# Patient Record
Sex: MALE | Race: WHITE
[De-identification: names, ages, dates, MRNs, and addresses within clinical notes are randomized per-mention and may not be internally consistent; named-entity substitution may affect disease eponyms.]

---

## 2020-05-03 ENCOUNTER — HOSPITAL ENCOUNTER (EMERGENCY)
Dept: HOSPITAL 47 - EC | Age: 35
Discharge: HOME | End: 2020-05-03
Payer: COMMERCIAL

## 2020-05-03 VITALS — DIASTOLIC BLOOD PRESSURE: 52 MMHG | HEART RATE: 62 BPM | TEMPERATURE: 97.7 F | SYSTOLIC BLOOD PRESSURE: 100 MMHG

## 2020-05-03 VITALS — RESPIRATION RATE: 18 BRPM

## 2020-05-03 DIAGNOSIS — Z88.1: ICD-10-CM

## 2020-05-03 DIAGNOSIS — N20.2: Primary | ICD-10-CM

## 2020-05-03 DIAGNOSIS — F17.200: ICD-10-CM

## 2020-05-03 LAB
ALBUMIN SERPL-MCNC: 3.9 G/DL (ref 3.5–5)
ALP SERPL-CCNC: 69 U/L (ref 38–126)
ALT SERPL-CCNC: 19 U/L (ref 4–49)
AMYLASE SERPL-CCNC: 69 U/L (ref 30–110)
ANION GAP SERPL CALC-SCNC: 5 MMOL/L
AST SERPL-CCNC: 26 U/L (ref 17–59)
BASOPHILS # BLD AUTO: 0 K/UL (ref 0–0.2)
BASOPHILS NFR BLD AUTO: 0 %
BUN SERPL-SCNC: 17 MG/DL (ref 9–20)
CALCIUM SPEC-MCNC: 9.1 MG/DL (ref 8.4–10.2)
CHLORIDE SERPL-SCNC: 108 MMOL/L (ref 98–107)
CO2 SERPL-SCNC: 26 MMOL/L (ref 22–30)
EOSINOPHIL # BLD AUTO: 0.2 K/UL (ref 0–0.7)
EOSINOPHIL NFR BLD AUTO: 3 %
ERYTHROCYTE [DISTWIDTH] IN BLOOD BY AUTOMATED COUNT: 4.81 M/UL (ref 4.3–5.9)
ERYTHROCYTE [DISTWIDTH] IN BLOOD: 11.8 % (ref 11.5–15.5)
GLUCOSE SERPL-MCNC: 91 MG/DL (ref 74–99)
HCT VFR BLD AUTO: 46.9 % (ref 39–53)
HGB BLD-MCNC: 15.9 GM/DL (ref 13–17.5)
LYMPHOCYTES # SPEC AUTO: 1.2 K/UL (ref 1–4.8)
LYMPHOCYTES NFR SPEC AUTO: 22 %
MCH RBC QN AUTO: 33.1 PG (ref 25–35)
MCHC RBC AUTO-ENTMCNC: 33.9 G/DL (ref 31–37)
MCV RBC AUTO: 97.5 FL (ref 80–100)
MONOCYTES # BLD AUTO: 0.3 K/UL (ref 0–1)
MONOCYTES NFR BLD AUTO: 5 %
NEUTROPHILS # BLD AUTO: 3.7 K/UL (ref 1.3–7.7)
NEUTROPHILS NFR BLD AUTO: 68 %
PH UR: 7 [PH] (ref 5–8)
PLATELET # BLD AUTO: 185 K/UL (ref 150–450)
POTASSIUM SERPL-SCNC: 3.8 MMOL/L (ref 3.5–5.1)
PROT SERPL-MCNC: 6.5 G/DL (ref 6.3–8.2)
RBC UR QL: >182 /HPF (ref 0–5)
SODIUM SERPL-SCNC: 139 MMOL/L (ref 137–145)
SP GR UR: 1.01 (ref 1–1.03)
SQUAMOUS UR QL AUTO: <1 /HPF (ref 0–4)
UROBILINOGEN UR QL STRIP: <2 MG/DL (ref ?–2)
WBC # BLD AUTO: 5.5 K/UL (ref 3.8–10.6)
WBC # UR AUTO: 13 /HPF (ref 0–5)

## 2020-05-03 PROCEDURE — 96375 TX/PRO/DX INJ NEW DRUG ADDON: CPT

## 2020-05-03 PROCEDURE — 80053 COMPREHEN METABOLIC PANEL: CPT

## 2020-05-03 PROCEDURE — 74176 CT ABD & PELVIS W/O CONTRAST: CPT

## 2020-05-03 PROCEDURE — 74018 RADEX ABDOMEN 1 VIEW: CPT

## 2020-05-03 PROCEDURE — 83690 ASSAY OF LIPASE: CPT

## 2020-05-03 PROCEDURE — 81001 URINALYSIS AUTO W/SCOPE: CPT

## 2020-05-03 PROCEDURE — 96361 HYDRATE IV INFUSION ADD-ON: CPT

## 2020-05-03 PROCEDURE — 85025 COMPLETE CBC W/AUTO DIFF WBC: CPT

## 2020-05-03 PROCEDURE — 96374 THER/PROPH/DIAG INJ IV PUSH: CPT

## 2020-05-03 PROCEDURE — 99285 EMERGENCY DEPT VISIT HI MDM: CPT

## 2020-05-03 PROCEDURE — 87086 URINE CULTURE/COLONY COUNT: CPT

## 2020-05-03 PROCEDURE — 82150 ASSAY OF AMYLASE: CPT

## 2020-05-03 PROCEDURE — 36415 COLL VENOUS BLD VENIPUNCTURE: CPT

## 2020-05-03 NOTE — CT
EXAMINATION TYPE: CT abdomen pelvis wo con

 

DATE OF EXAM: 5/3/2020

 

COMPARISON: Previous study dated 12/7/2014. 

 

HISTORY: Hematuria

 

CT DLP: 358.5 mGycm

Automated exposure control for dose reduction was used.

 

FINDINGS: Visualized portions of the lungs are clear. There is no pleural or pericardial fluid. The h
eart is not enlarged.

 

Within the abdomen, the liver, spleen and gallbladder are normal.

 

Both adrenal glands are normal.

 

There is a 4 mm calculus in the posterior pole calyx of the right kidney which is nonobstructing. The
re is a 2 mm calculus in the posterior upper pole calyx of the left kidney. There is a 3 mm calculus 
in the distal left ureter at the level of the UVJ. There is no evidence of hydronephrosis.

 

Very Limited views of the pancreas are unremarkable.

 

There is no significant retroperitoneal, iliac or inguinal adenopathy.

 

The bladder is unremarkable

 

Both the large and small bowel are normal. The appendix is unremarkable. There is no free fluid and n
o free air.

 

No osseous lesion is seen.

 

IMPRESSION: 

1. BILATERAL NONOBSTRUCTING NEPHROLITHIASIS.

2. DISTAL LEFT URETERIC CALCULUS MEASURING 3 MM IS NOT CAUSING SIGNIFICANT HYDRONEPHROSIS.

## 2020-05-03 NOTE — XR
EXAMINATION TYPE: XR KUB , 2 VIEWS

 

DATE OF EXAM ORDERED: 5/3/2020

 

HISTORY: abdominal pain.

 

COMPARISON: Previous study dated 12/7/2014.

 

FINDINGS:  The lung bases are clear.

 

Within the abdomen, the abdominal gas pattern is normal. There is no evidence of obstruction or free 
air. No unusual calcifications are seen.

 

IMPRESSION: 

 

NO ACUTE INTRA-ABDOMINAL ABNORMALITY.

## 2020-05-03 NOTE — ED
Abdominal Pain HPI





- General


Chief Complaint: Abdominal Pain


Stated Complaint: abd pain


Time Seen by Provider: 05/03/20 10:23


Source: patient, RN notes reviewed, old records reviewed


Mode of arrival: ambulatory


Limitations: no limitations





- History of Present Illness


Initial Comments: 





This Patient is a 35-year-old male presents emergency department today with 

sudden onset of left lower quadrant abdominal pain after having a bowel movement

today.  He reports he has some radiation towards his back.  He states it feels 

similar to some history of kidney stones.  Patient states that he's had no 

changes in urination.  He denies any other complaints.





- Related Data


                                  Previous Rx's











 Medication  Instructions  Recorded


 


Hydrocodone/Acetaminophen [Norco 1 each PO Q6HR PRN #20 tab 12/07/14





5-325]  


 


Ibuprofen [Motrin] 800 mg PO Q6HR PRN #30 tab 12/07/14


 


Ondansetron Odt [Zofran ODT] 4 mg PO Q8HR PRN #15 tab 12/07/14


 


Tamsulosin [Flomax] 0.4 mg PO DAILY #3 cap 12/07/14


 


HYDROcodone/APAP 5-325MG [Norco 1 tab PO Q6HR PRN 3 Days #12 tab 05/03/20





5-325]  


 


Ketorolac [Toradol] 10 mg PO Q6HR #12 tab 05/03/20


 


Tamsulosin [Flomax] 0.4 mg PO DAILY #7 cap 05/03/20











                                    Allergies











Allergy/AdvReac Type Severity Reaction Status Date / Time


 


ceftriaxone sodium Allergy  Anaphylaxis Verified 05/03/20 10:18





[From Rocephin]     














Review of Systems


ROS Statement: 


Those systems with pertinent positive or pertinent negative responses have been 

documented in the HPI.





ROS Other: All systems not noted in ROS Statement are negative.





Past Medical History


Past Medical History: No Reported History


History of Any Multi-Drug Resistant Organisms: None Reported


Past Surgical History: Tonsillectomy


Past Psychological History: No Psychological Hx Reported


Smoking Status: Current every day smoker


Past Alcohol Use History: None Reported


Past Drug Use History: None Reported





General Exam





- General Exam Comments


Initial Comments: 





35 year old male no distress. 


Limitations: no limitations


General appearance: alert, in no apparent distress


Head exam: Present: atraumatic, normocephalic, normal inspection


Eye exam: Present: normal appearance, PERRL, EOMI.  Absent: scleral icterus, 

conjunctival injection, periorbital swelling


ENT exam: Present: normal exam


Neck exam: Present: normal inspection.  Absent: tenderness, meningismus, 

lymphadenopathy


Respiratory exam: Present: normal lung sounds bilaterally.  Absent: respiratory 

distress, wheezes, rales, rhonchi, stridor


Cardiovascular Exam: Present: regular rate, normal rhythm, normal heart sounds. 

Absent: systolic murmur, diastolic murmur, rubs, gallop, clicks


GI/Abdominal exam: Present: soft, tenderness (LLQ pain)





Course


                                   Vital Signs











  05/03/20 05/03/20 05/03/20





  10:15 11:30 12:26


 


Temperature 97.9 F  


 


Pulse Rate 80 82 59 L


 


Respiratory 18 20 18





Rate   


 


Blood Pressure 124/85 124/61 99/56


 


O2 Sat by Pulse 99 99 100





Oximetry   














  05/03/20





  13:51


 


Temperature 97.7 F


 


Pulse Rate 62


 


Respiratory 18





Rate 


 


Blood Pressure 100/52


 


O2 Sat by Pulse 100





Oximetry 














Medical Decision Making





- Medical Decision Making


35 year old male with acute LLQ abdominal pain. Labs are unremarble, UA is 

positive for hematuria. Patient CT abdomen and pelvis shows 3mm L UVJ stone. Di

scussed this should pass and will dc with pain medication and flomax. Discussed 

return parameters.








- Lab Data


Result diagrams: 


                                 05/03/20 10:50





                                 05/03/20 10:50


                                   Lab Results











  05/03/20 05/03/20 05/03/20 Range/Units





  10:50 10:50 12:20 


 


WBC  5.5    (3.8-10.6)  k/uL


 


RBC  4.81    (4.30-5.90)  m/uL


 


Hgb  15.9    (13.0-17.5)  gm/dL


 


Hct  46.9    (39.0-53.0)  %


 


MCV  97.5    (80.0-100.0)  fL


 


MCH  33.1    (25.0-35.0)  pg


 


MCHC  33.9    (31.0-37.0)  g/dL


 


RDW  11.8    (11.5-15.5)  %


 


Plt Count  185    (150-450)  k/uL


 


Neutrophils %  68    %


 


Lymphocytes %  22    %


 


Monocytes %  5    %


 


Eosinophils %  3    %


 


Basophils %  0    %


 


Neutrophils #  3.7    (1.3-7.7)  k/uL


 


Lymphocytes #  1.2    (1.0-4.8)  k/uL


 


Monocytes #  0.3    (0-1.0)  k/uL


 


Eosinophils #  0.2    (0-0.7)  k/uL


 


Basophils #  0.0    (0-0.2)  k/uL


 


Sodium   139   (137-145)  mmol/L


 


Potassium   3.8   (3.5-5.1)  mmol/L


 


Chloride   108 H   ()  mmol/L


 


Carbon Dioxide   26   (22-30)  mmol/L


 


Anion Gap   5   mmol/L


 


BUN   17   (9-20)  mg/dL


 


Creatinine   0.85   (0.66-1.25)  mg/dL


 


Est GFR (CKD-EPI)AfAm   >90   (>60 ml/min/1.73 sqM)  


 


Est GFR (CKD-EPI)NonAf   >90   (>60 ml/min/1.73 sqM)  


 


Glucose   91   (74-99)  mg/dL


 


Calcium   9.1   (8.4-10.2)  mg/dL


 


Total Bilirubin   0.8   (0.2-1.3)  mg/dL


 


AST   26   (17-59)  U/L


 


ALT   19   (4-49)  U/L


 


Alkaline Phosphatase   69   ()  U/L


 


Total Protein   6.5   (6.3-8.2)  g/dL


 


Albumin   3.9   (3.5-5.0)  g/dL


 


Amylase   69   ()  U/L


 


Lipase   228   ()  U/L


 


Urine Color    Light Red  


 


Urine Appearance    Clear  (Clear)  


 


Urine pH    7.0  (5.0-8.0)  


 


Ur Specific Gravity    1.013  (1.001-1.035)  


 


Urine Protein    Trace H  (Negative)  


 


Urine Glucose (UA)    Negative  (Negative)  


 


Urine Ketones    Negative  (Negative)  


 


Urine Blood    Large H  (Negative)  


 


Urine Nitrite    Negative  (Negative)  


 


Urine Bilirubin    Negative  (Negative)  


 


Urine Urobilinogen    <2.0  (<2.0)  mg/dL


 


Ur Leukocyte Esterase    Trace H  (Negative)  


 


Urine RBC    >182 H  (0-5)  /hpf


 


Urine WBC    13 H  (0-5)  /hpf


 


Ur Squamous Epith Cells    <1  (0-4)  /hpf


 


Urine Mucus    Few H  (None)  /hpf














- Radiology Data


Radiology results: report reviewed


CTshows evidence of 3mm UVJ stone.  





Disposition


Clinical Impression: 


 Left ureteral stone





Disposition: HOME SELF-CARE


Condition: Good


Instructions (If sedation given, give patient instructions):  Ureteral Stones 

(ED)


Additional Instructions: 


Recommend follow-up with primary care doctor.  Encourage fluid intake.  Take the

medications as prescribed.  Return to ED if any alarming signs or symptoms 

occur.


Prescriptions: 


Tamsulosin [Flomax] 0.4 mg PO DAILY #7 cap


HYDROcodone/APAP 5-325MG [Norco 5-325] 1 tab PO Q6HR PRN 3 Days #12 tab


 PRN Reason: Pain


Ketorolac [Toradol] 10 mg PO Q6HR #12 tab


Is patient prescribed a controlled substance at d/c from ED?: Yes


If prescribed controlled substance>3 days was MAPS reviewed?: Prescribed <3 Days


If opioid is for acute pain is fill amount 7 days or less?: Yes


If Rx opioid, was Start Talking consent form obtained?: Yes


Referrals: 


Yousif Myers DO [Primary Care Provider] - 1-2 days


Jonnathan Cartagena MD [STAFF PHYSICIAN] - 1-2 days


Time of Disposition: 13:32

## 2020-09-19 ENCOUNTER — HOSPITAL ENCOUNTER (EMERGENCY)
Dept: HOSPITAL 47 - EC | Age: 35
Discharge: HOME | End: 2020-09-19
Payer: COMMERCIAL

## 2020-09-19 VITALS — RESPIRATION RATE: 18 BRPM

## 2020-09-19 VITALS — TEMPERATURE: 98 F | SYSTOLIC BLOOD PRESSURE: 142 MMHG | HEART RATE: 82 BPM | DIASTOLIC BLOOD PRESSURE: 80 MMHG

## 2020-09-19 DIAGNOSIS — Z88.1: ICD-10-CM

## 2020-09-19 DIAGNOSIS — F17.200: ICD-10-CM

## 2020-09-19 DIAGNOSIS — H61.892: Primary | ICD-10-CM

## 2020-09-19 PROCEDURE — 99282 EMERGENCY DEPT VISIT SF MDM: CPT

## 2020-09-19 NOTE — ED
General Adult HPI





- General


Chief complaint: ENT


Stated complaint: Lump behind left ear, Migraine


Time Seen by Provider: 09/19/20 15:28


Source: patient, RN notes reviewed


Mode of arrival: ambulatory


Limitations: no limitations





- History of Present Illness


Initial comments: 





35-year-old male presents to the emergency department for a chief complaint of 

cyst on the left ear.  Patient states he has had this cyst for years but in the 

past 3 days it has gotten bigger and caused pain.  Patient reports he cannot 

sleep on that side because it presses against the bed.  States when he does so 

it causes a headache.  Patient denies fevers.  Denies drainage.  States he has 

an appointment with dermatology in 10 days.Patient has no other complaints at 

this time including shortness of breath, chest pain, abdominal pain, nausea or 

vomiting, headache, or visual changes.





- Related Data


                                  Previous Rx's











 Medication  Instructions  Recorded


 


Hydrocodone/Acetaminophen [Norco 1 each PO Q6HR PRN #20 tab 12/07/14





5-325]  


 


Ibuprofen [Motrin] 800 mg PO Q6HR PRN #30 tab 12/07/14


 


Ondansetron Odt [Zofran ODT] 4 mg PO Q8HR PRN #15 tab 12/07/14


 


Tamsulosin [Flomax] 0.4 mg PO DAILY #3 cap 12/07/14


 


HYDROcodone/APAP 5-325MG [Norco 1 tab PO Q6HR PRN 3 Days #12 tab 05/03/20





5-325]  


 


Ketorolac [Toradol] 10 mg PO Q6HR #12 tab 05/03/20


 


Tamsulosin [Flomax] 0.4 mg PO DAILY #7 cap 05/03/20











                                    Allergies











Allergy/AdvReac Type Severity Reaction Status Date / Time


 


ceftriaxone sodium Allergy  Anaphylaxis Verified 09/19/20 15:27





[From Rocephin]     














Review of Systems


ROS Statement: 


Those systems with pertinent positive or pertinent negative responses have been 

documented in the HPI.





ROS Other: All systems not noted in ROS Statement are negative.





Past Medical History


Past Medical History: No Reported History


History of Any Multi-Drug Resistant Organisms: None Reported


Past Surgical History: Tonsillectomy


Past Psychological History: No Psychological Hx Reported


Smoking Status: Current every day smoker


Past Alcohol Use History: None Reported


Past Drug Use History: None Reported





General Exam


Limitations: no limitations


General appearance: alert, in no apparent distress


Head exam: Present: atraumatic, normocephalic, normal inspection


Eye exam: Present: normal appearance, PERRL, EOMI.  Absent: scleral icterus, 

conjunctival injection, periorbital swelling


ENT exam: Present: normal oropharynx.  Absent: normal external ear exam (Patient

has a 1 cm x 1 cm vascular cyst noted on the posterior auricle of the left ear. 

This is non-erythematous.  It is not warm to touch.  It is not consistent with 

abscess.)


Neck exam: Present: normal inspection, full ROM.  Absent: tenderness (No 

tenderness in the neck, mastoid or behind the left ear.), meningismus, 

lymphadenopathy


Respiratory exam: Present: normal lung sounds bilaterally.  Absent: respiratory 

distress, wheezes, rales, rhonchi, stridor


Cardiovascular Exam: Present: regular rate, normal rhythm, normal heart sounds. 

Absent: systolic murmur, diastolic murmur, rubs, gallop, clicks





Course


                                   Vital Signs











  09/19/20





  15:24


 


Temperature 98.4 F


 


Pulse Rate 87


 


Respiratory 18





Rate 


 


Blood Pressure 125/77


 


O2 Sat by Pulse 98





Oximetry 














Medical Decision Making





- Medical Decision Making





Patient presents with enlarged cyst on the posterior auricle of the left ear.  

This is not consistent with abscess.  It is not erythematous, warm to touch, or 

purulent.  At this time I recommend patient follow up with ENT for excision.  He

will return here for any worsening symptoms or fevers.





Disposition


Clinical Impression: 


 Cyst on ear





Disposition: HOME SELF-CARE


Condition: Good


Instructions (If sedation given, give patient instructions):  Cyst (ED)


Additional Instructions: 


Please follow-up with ENT.  If you have any worsening symptoms or fevers return 

to the emergency room.


Is patient prescribed a controlled substance at d/c from ED?: No


Referrals: 


Yousif Myers DO [Primary Care Provider] - 1-2 days


Sixto Mazariegos MD [STAFF PHYSICIAN] - 1-2 days


Time of Disposition: 15:46

## 2021-03-25 ENCOUNTER — HOSPITAL ENCOUNTER (EMERGENCY)
Dept: HOSPITAL 47 - EC | Age: 36
Discharge: HOME | End: 2021-03-25
Payer: COMMERCIAL

## 2021-03-25 VITALS — HEART RATE: 64 BPM | DIASTOLIC BLOOD PRESSURE: 65 MMHG | SYSTOLIC BLOOD PRESSURE: 104 MMHG

## 2021-03-25 VITALS — TEMPERATURE: 97.5 F | RESPIRATION RATE: 18 BRPM

## 2021-03-25 DIAGNOSIS — R51.9: ICD-10-CM

## 2021-03-25 DIAGNOSIS — E86.0: Primary | ICD-10-CM

## 2021-03-25 DIAGNOSIS — F17.200: ICD-10-CM

## 2021-03-25 LAB
ALBUMIN SERPL-MCNC: 3.8 G/DL (ref 3.5–5)
ALP SERPL-CCNC: 76 U/L (ref 38–126)
ALT SERPL-CCNC: 25 U/L (ref 4–49)
ANION GAP SERPL CALC-SCNC: 8 MMOL/L
AST SERPL-CCNC: 33 U/L (ref 17–59)
BASOPHILS # BLD AUTO: 0.2 K/UL (ref 0–0.2)
BASOPHILS NFR BLD AUTO: 3 %
BUN SERPL-SCNC: 14 MG/DL (ref 9–20)
CALCIUM SPEC-MCNC: 8.6 MG/DL (ref 8.4–10.2)
CHLORIDE SERPL-SCNC: 105 MMOL/L (ref 98–107)
CO2 SERPL-SCNC: 23 MMOL/L (ref 22–30)
EOSINOPHIL # BLD AUTO: 0 K/UL (ref 0–0.7)
EOSINOPHIL NFR BLD AUTO: 1 %
ERYTHROCYTE [DISTWIDTH] IN BLOOD BY AUTOMATED COUNT: 4.89 M/UL (ref 4.3–5.9)
ERYTHROCYTE [DISTWIDTH] IN BLOOD: 11.6 % (ref 11.5–15.5)
GLUCOSE SERPL-MCNC: 93 MG/DL (ref 74–99)
HCT VFR BLD AUTO: 45.8 % (ref 39–53)
HGB BLD-MCNC: 16.1 GM/DL (ref 13–17.5)
LIPASE SERPL-CCNC: 137 U/L (ref 23–300)
LYMPHOCYTES # SPEC AUTO: 0.8 K/UL (ref 1–4.8)
LYMPHOCYTES NFR SPEC AUTO: 16 %
MAGNESIUM SPEC-SCNC: 1.9 MG/DL (ref 1.6–2.3)
MCH RBC QN AUTO: 33 PG (ref 25–35)
MCHC RBC AUTO-ENTMCNC: 35.2 G/DL (ref 31–37)
MCV RBC AUTO: 93.7 FL (ref 80–100)
MONOCYTES # BLD AUTO: 1 K/UL (ref 0–1)
MONOCYTES NFR BLD AUTO: 19 %
NEUTROPHILS # BLD AUTO: 3.1 K/UL (ref 1.3–7.7)
NEUTROPHILS NFR BLD AUTO: 61 %
PLATELET # BLD AUTO: 131 K/UL (ref 150–450)
POTASSIUM SERPL-SCNC: 3.7 MMOL/L (ref 3.5–5.1)
PROT SERPL-MCNC: 6.3 G/DL (ref 6.3–8.2)
SODIUM SERPL-SCNC: 136 MMOL/L (ref 137–145)
WBC # BLD AUTO: 5.2 K/UL (ref 3.8–10.6)

## 2021-03-25 PROCEDURE — 96375 TX/PRO/DX INJ NEW DRUG ADDON: CPT

## 2021-03-25 PROCEDURE — 96374 THER/PROPH/DIAG INJ IV PUSH: CPT

## 2021-03-25 PROCEDURE — 83735 ASSAY OF MAGNESIUM: CPT

## 2021-03-25 PROCEDURE — 99284 EMERGENCY DEPT VISIT MOD MDM: CPT

## 2021-03-25 PROCEDURE — 96361 HYDRATE IV INFUSION ADD-ON: CPT

## 2021-03-25 PROCEDURE — 36415 COLL VENOUS BLD VENIPUNCTURE: CPT

## 2021-03-25 PROCEDURE — 85025 COMPLETE CBC W/AUTO DIFF WBC: CPT

## 2021-03-25 PROCEDURE — 80053 COMPREHEN METABOLIC PANEL: CPT

## 2021-03-25 PROCEDURE — 83690 ASSAY OF LIPASE: CPT

## 2021-03-25 NOTE — ED
Headache HPI





- General


Chief Complaint: Headache


Stated Complaint: Alcohol Poisoning/4x day ago/dehydrated


Time Seen by Provider: 03/25/21 08:44


Source: patient, RN notes reviewed


Mode of arrival: ambulatory


Limitations: no limitations





- History of Present Illness


Initial Comments: 





36-year-old male presents emergency Department chief complaint of possible 

dehydration.  Patient states he drank heavily 4 days ago for his bradycardia.  

Patient states that when more than he usually what and has not drank recently.  

Patient states that he had vomiting for several days states that his vomiting.  

States he just feels dehydrated complaint of body aches, slight nausea, 

headache.  Patient states the headache comes and goes denies being severe no 

neck Stiffness no head trauma.  Denies any blurred vision no focal weakness.  

Denies any URI symptoms.





- Related Data


                                Home Medications











 Medication  Instructions  Recorded  Confirmed


 


No Known Home Medications  03/25/21 03/25/21











                                    Allergies











Allergy/AdvReac Type Severity Reaction Status Date / Time


 


ceftriaxone sodium Allergy  Anaphylaxis Verified 03/25/21 09:50





[From Rocephin]     














Review of Systems


ROS Statement: 


Those systems with pertinent positive or pertinent negative responses have been 

documented in the HPI.





ROS Other: All systems not noted in ROS Statement are negative.





Past Medical History


Past Medical History: No Reported History


History of Any Multi-Drug Resistant Organisms: None Reported


Past Surgical History: Tonsillectomy


Past Psychological History: No Psychological Hx Reported


Smoking Status: Current every day smoker


Past Alcohol Use History: Occasional


Past Drug Use History: Marijuana





General Exam


Limitations: no limitations


General appearance: alert, in no apparent distress


Head exam: Present: atraumatic, normocephalic, normal inspection


Eye exam: Present: normal appearance, PERRL, EOMI.  Absent: scleral icterus, 

conjunctival injection, periorbital swelling


ENT exam: Present: normal exam, normal oropharynx, mucous membranes moist


Neck exam: Present: normal inspection, full ROM.  Absent: tenderness, 

meningismus, lymphadenopathy


Respiratory exam: Present: normal lung sounds bilaterally.  Absent: respiratory 

distress, wheezes, rales, rhonchi, stridor


Cardiovascular Exam: Present: regular rate, normal rhythm, normal heart sounds. 

Absent: systolic murmur, diastolic murmur, rubs, gallop, clicks


GI/Abdominal exam: Present: soft, normal bowel sounds.  Absent: distended, 

tenderness, guarding, rebound, rigid


Back exam: Absent: CVA tenderness (R), CVA tenderness (L)


Neurological exam: Present: alert, oriented X3


Skin exam: Present: warm, dry, intact, normal color.  Absent: rash





Course


                                   Vital Signs











  03/25/21 03/25/21





  08:37 11:24


 


Temperature 97.5 F L 


 


Pulse Rate 71 64


 


Respiratory 18 18





Rate  


 


Blood Pressure 119/74 104/65


 


O2 Sat by Pulse 98 98





Oximetry  














Medical Decision Making





- Medical Decision Making





Patient updated and results reevaluated resting comfortable he states his 

headache is resolved.  Patient most likely is dehydrated from alcohol 

intoxication.  Patient be discharged in stable condition return parameters were 

discussed.





- Lab Data


Result diagrams: 


                                 03/25/21 08:59





                                 03/25/21 08:59


                                   Lab Results











  03/25/21 03/25/21 Range/Units





  08:59 08:59 


 


WBC  5.2   (3.8-10.6)  k/uL


 


RBC  4.89   (4.30-5.90)  m/uL


 


Hgb  16.1   (13.0-17.5)  gm/dL


 


Hct  45.8   (39.0-53.0)  %


 


MCV  93.7   (80.0-100.0)  fL


 


MCH  33.0   (25.0-35.0)  pg


 


MCHC  35.2   (31.0-37.0)  g/dL


 


RDW  11.6   (11.5-15.5)  %


 


Plt Count  131 L   (150-450)  k/uL


 


MPV  7.3   


 


Neutrophils %  61   %


 


Lymphocytes %  16   %


 


Monocytes %  19   %


 


Eosinophils %  1   %


 


Basophils %  3   %


 


Neutrophils #  3.1   (1.3-7.7)  k/uL


 


Lymphocytes #  0.8 L   (1.0-4.8)  k/uL


 


Monocytes #  1.0   (0-1.0)  k/uL


 


Eosinophils #  0.0   (0-0.7)  k/uL


 


Basophils #  0.2   (0-0.2)  k/uL


 


Sodium   136 L  (137-145)  mmol/L


 


Potassium   3.7  (3.5-5.1)  mmol/L


 


Chloride   105  ()  mmol/L


 


Carbon Dioxide   23  (22-30)  mmol/L


 


Anion Gap   8  mmol/L


 


BUN   14  (9-20)  mg/dL


 


Creatinine   0.93  (0.66-1.25)  mg/dL


 


Est GFR (CKD-EPI)AfAm   >90  (>60 ml/min/1.73 sqM)  


 


Est GFR (CKD-EPI)NonAf   >90  (>60 ml/min/1.73 sqM)  


 


Glucose   93  (74-99)  mg/dL


 


Calcium   8.6  (8.4-10.2)  mg/dL


 


Magnesium   1.9  (1.6-2.3)  mg/dL


 


Total Bilirubin   0.8  (0.2-1.3)  mg/dL


 


AST   33  (17-59)  U/L


 


ALT   25  (4-49)  U/L


 


Alkaline Phosphatase   76  ()  U/L


 


Total Protein   6.3  (6.3-8.2)  g/dL


 


Albumin   3.8  (3.5-5.0)  g/dL


 


Lipase   137  ()  U/L














Disposition


Clinical Impression: 


 Dehydration, Headache





Disposition: HOME SELF-CARE


Condition: Stable


Instructions (If sedation given, give patient instructions):  Acute Headache (E

D)


Additional Instructions: 


Please return to the Emergency Department if symptoms worsen or any other 

concerns.


Is patient prescribed a controlled substance at d/c from ED?: No


Referrals: 


Cristian Mancera MD [Primary Care Provider] - 1-2 days


Time of Disposition: 11:37

## 2024-12-12 NOTE — ED
General Adult HPI





- General


Chief complaint: Urogenital


Stated complaint: flank pain


Time Seen by Provider: 12/12/24 20:30


Source: patient, EMS, RN notes reviewed


Mode of arrival: EMS


Limitations: no limitations





- History of Present Illness


Initial comments: 


This is a 39-year-old male presents emergency department via EMS for chief 

complaint of right flank pain over the past day that has been worsening.  States

that the pain will radiate into his left lower abdomen and feels similar as when

he has had kidney stones in the past.  Additionally, patient states that he had 

episode of gross hematuria prior to arrival.  He denies fevers, chills, dysuria 

or increase in urinary frequency or urgency.  Endorses nausea with no vomiting. 

Denies previous urological surgeries. states he has not followed with a 

urologist in the past. 








- Related Data


                                  Previous Rx's











 Medication  Instructions  Recorded


 


Ketorolac [Toradol] 10 mg PO Q8HR #15 tab 12/12/24


 


Ondansetron Odt [Zofran Odt] 4 mg PO Q8HR PRN #10 tab 12/12/24


 


Tamsulosin [Flomax] 0.4 mg PO DAILY #7 cap 12/12/24











                                    Allergies











Allergy/AdvReac Type Severity Reaction Status Date / Time


 


ceftriaxone sodium Allergy  Anaphylaxis Verified 12/12/24 20:36





[From Rocephin]     














Review of Systems


ROS Statement: 


Those systems with pertinent positive or pertinent negative responses have been 

documented in the HPI.





ROS Other: All systems not noted in ROS Statement are negative.





Past Medical History


Past Medical History: No Reported History


Additional Past Medical History / Comment(s): kidney stones


History of Any Multi-Drug Resistant Organisms: None Reported


Past Surgical History: Tonsillectomy


Past Psychological History: No Psychological Hx Reported


Smoking Status: Current every day smoker


Past Alcohol Use History: Occasional


Past Drug Use History: Marijuana





General Exam





- General Exam Comments


Initial Comments: 


Visual Physical Exam


 


Vital signs reviewed


 


General: Well-appearing, nontoxic, no acute distress.


Head: Normocephalic, atraumatic


Eyes: PERRLA, EOMI


ENT: Airway patent


Chest: Nonlabored breathing


Skin: No visual rash, normal skin tone


Neuro: Alert and oriented 3


Musculoskeletal: No gross abnormalities








Limitations: no limitations


General appearance: alert, in no apparent distress


Eye exam: Present: normal appearance, PERRL, EOMI.  Absent: scleral icterus, 

conjunctival injection, periorbital swelling


ENT exam: Present: normal exam, mucous membranes moist


Respiratory exam: Present: normal lung sounds bilaterally.  Absent: respiratory 

distress, wheezes, rales, rhonchi, stridor


GI/Abdominal exam: Present: soft, tenderness (right mid abomen), normal bowel 

sounds.  Absent: distended, guarding, rebound, rigid


Extremities exam: Present: normal inspection, full ROM, normal capillary refill.

 Absent: tenderness, pedal edema, joint swelling, calf tenderness


Back exam: Present: normal inspection, CVA tenderness (R).  Absent: CVA 

tenderness (L)


Neurological exam: Present: alert, oriented X3, CN II-XII intact





Course


                                   Vital Signs











  12/12/24 12/12/24





  20:36 23:40


 


Temperature 97.7 F 98.7 F


 


Pulse Rate 72 74


 


Respiratory 18 16





Rate  


 


Blood Pressure 118/87 116/75


 


O2 Sat by Pulse 98 99





Oximetry  














Medical Decision Making





- Medical Decision Making


Was pt. sent in by a medical professional or institution (, PA, NP, urgent 

care, hospital, or nursing home...) When possible be specific


@ -No


Did you speak to anyone other than the patient for history (EMS, parent, family,

police, friend...)? What history was obtained from this source 


@ -No


Did you review nursing and triage notes (agree or disagree)? Why? 


@ -I reviewed and agree with nursing and triage notes


Were old charts reviewed (outside hosp., previous admission, EMS record, old 

EKG, old radiological studies, urgent care reports/EKG's, nursing home records)?

Report findings 


@ -No old charts were reviewed


Differential Diagnosis (chest pain, altered mental status, abdominal pain women,

abdominal pain men, vaginal bleeding, weakness, fever, dyspnea, syncope, 

headache, dizziness, GI bleed, back pain, seizure, CVA, palpatations, mental 

health, musculoskeletal)? 


@ -Differential Abdominal Pain Men:


Appendicitis, cholecystitis, diverticulosis, ischemic bowel, pancreatitis, 

hepatitis, UTI, gastroenteritis, AAA, incarcerated hernia, bowel obstruction, 

constipation, inflammatory bowel, hepatitis, peptic ulcer disease, splenic 

infarction, perforated viscus, testicular torsion, this is not meant to be an 

all-inclusive list





EKG interpreted by me (3pts min.).


@ -None


X-rays interpreted by me (1pt min.).


@ -None done


CT interpreted by me (1pt min.).


@ -CT of the abdomen pelvis without contrast remarkable for a right mid ureteral

3 mm calculus without hydronephrosis.


U/S interpreted by me (1pt. min.).


@ -None done


What testing was considered but not performed or refused? (CT, X-rays, U/S, 

labs)? Why?


@ -None


What meds were considered but not given or refused? Why?


@ -None


Did you discuss the management of the patient with other professionals 

(professionals i.e. , PA, NP, lab, RT, psych nurse, , , t

eacher, , )? Give summary


@ -No


Was smoking cessation discussed for >3mins.?


@ -No


Was critical care preformed (if so, how long)?


@ -No


Were there social determinants of health that impacted care today? How? 

(Homelessness, low income, unemployed, alcoholism, drug addiction, 

transportation, low edu. Level, literacy, decrease access to med. care, long-term, 

rehab)?


@ -No


Was there de-escalation of care discussed even if they declined (Discuss DNR or 

withdrawal of care, Hospice)? DNR status


@ -No


What co-morbidities impacted this encounter? (DM, HTN, Smoking, COPD, CAD, Cance

r, CVA, ARF, Chemo, Hep., AIDS, mental health diagnosis, sleep apnea, morbid 

obesity)?


@ -None


Was patient admitted / discharged? Hospital course, mention meds given and 

route, prescriptions, significant lab abnormalities, going to OR and other 

pertinent info.


@ -Discharge.  39-year-old male presenting with right flank pain with radiation 

into the right mid abdomen.  Patient is provided with Toradol, Zofran.  

Urinalysis remarkable for blood with no signs of infection.  CBC reveals leuk

ocytosis of 15.4 and elevated neutrophils 12.6 likely reactive.  CT remarkable 

for a right mid 3 mm ureteral stone.  Patient is provided with outpatient 

prescription for Zofran, Toradol, and Flomax.  He is provided with referral to 

urology to follow-up.  Discussed with Dr. Monk


Undiagnosed new problem with uncertain prognosis?


@ -No


Drug Therapy requiring intensive monitoring for toxicity (Heparin, Nitro, 

Insulin, Cardizem)?


@ -No


Were any procedures done?


@ -No


Diagnosis/symptom?


@ -Ureteral stone


Acute, or Chronic, or Acute on Chronic?


@ -acute


Uncomplicated (without systemic symptoms) or Complicated (systemic symptoms)?


@ -uncomplicated


Side effects of treatment?


@ -No


Exacerbation, Progression, or Severe Exacerbation?


@ -No


Poses a threat to life or bodily function? How? (Chest pain, USA, MI, pneumonia,

PE, COPD, DKA, ARF, appy, cholecystitis, CVA, Diverticulitis, Homicidal, 

Suicidal, threat to staff... and all critical care pts)


@ -No





- Lab Data


Result diagrams: 


                                 12/12/24 21:52





                                 12/12/24 21:52


                                   Lab Results











  12/12/24 12/12/24 12/12/24 Range/Units





  21:52 21:52 22:52 


 


WBC  15.4 H    (3.8-10.6)  k/uL


 


RBC  5.02    (4.30-5.90)  m/uL


 


Hgb  15.8    (13.0-17.5)  gm/dL


 


Hct  48.1    (39.0-53.0)  %


 


MCV  95.9    (80.0-100.0)  fL


 


MCH  31.6    (25.0-35.0)  pg


 


MCHC  32.9    (31.0-37.0)  g/dL


 


RDW  11.8    (11.5-15.5)  %


 


Plt Count  240    (150-450)  k/uL


 


MPV  7.0    


 


Neutrophils %  82    %


 


Lymphocytes %  11    %


 


Monocytes %  5    %


 


Eosinophils %  1    %


 


Basophils %  0    %


 


Neutrophils #  12.6 H    (1.3-7.7)  k/uL


 


Lymphocytes #  1.7    (1.0-4.8)  k/uL


 


Monocytes #  0.8    (0-1.0)  k/uL


 


Eosinophils #  0.2    (0-0.7)  k/uL


 


Basophils #  0.0    (0-0.2)  k/uL


 


Sodium   137   (137-145)  mmol/L


 


Potassium   4.1   (3.5-5.1)  mmol/L


 


Chloride   106   ()  mmol/L


 


Carbon Dioxide   28   (22-30)  mmol/L


 


Anion Gap   3   mmol/L


 


BUN   16   (9-20)  mg/dL


 


Creatinine   0.88   (0.66-1.25)  mg/dL


 


Est GFR (CKD-EPI)AfAm   >90   (>60 ml/min/1.73 sqM)  


 


Est GFR (CKD-EPI)NonAf   >90   (>60 ml/min/1.73 sqM)  


 


Glucose   128 H   (74-99)  mg/dL


 


Calcium   9.7   (8.4-10.2)  mg/dL


 


Total Bilirubin   1.0   (0.2-1.3)  mg/dL


 


AST   23   (17-59)  U/L


 


ALT   15   (4-49)  U/L


 


Alkaline Phosphatase   55   ()  U/L


 


Total Protein   6.9   (6.3-8.2)  g/dL


 


Albumin   4.8   (3.5-5.0)  g/dL


 


Lipase   481 H   ()  U/L


 


Urine Color    Light Red  


 


Urine Appearance    Cloudy  (Clear)  


 


Urine pH    6.0  (5.0-8.0)  


 


Ur Specific Gravity    1.026  (1.001-1.035)  


 


Urine Protein    1+ H  (Negative)  


 


Urine Glucose (UA)    Negative  (Negative)  


 


Urine Ketones    Negative  (Negative)  


 


Urine Blood    Large H  (Negative)  


 


Urine Nitrite    Negative  (Negative)  


 


Urine Bilirubin    Negative  (Negative)  


 


Urine Urobilinogen    2.0  (<2.0)  mg/dL


 


Ur Leukocyte Esterase    Negative  (Negative)  


 


Urine RBC    >182 H  (0-5)  /hpf


 


Urine WBC    7 H  (0-5)  /hpf


 


Ur Squamous Epith Cells    1  (0-4)  /hpf


 


Urine Mucus    Many H  (None)  /hpf














Disposition


Clinical Impression: 


 Ureterolithiasis





Disposition: HOME SELF-CARE


Condition: Stable


Instructions (If sedation given, give patient instructions):  Kidney Stones (ED)


Additional Instructions: 


Please return to the Emergency Department if symptoms worsen or any other 

concerns.


Prescriptions: 


Tamsulosin [Flomax] 0.4 mg PO DAILY #7 cap


Ketorolac [Toradol] 10 mg PO Q8HR #15 tab


Ondansetron Odt [Zofran Odt] 4 mg PO Q8HR PRN #10 tab


 PRN Reason: Nausea


Is patient prescribed a controlled substance at d/c from ED?: No


Referrals: 


Cristian Mancera MD [Primary Care Provider] - 1-2 days


Dipesh Woodson MD [STAFF PHYSICIAN] - 1-2 days


Time of Disposition: 23:30

## 2024-12-12 NOTE — CT
EXAMINATION TYPE: CT abdomen pelvis wo con

CT DLP: 333.7 mGycm, Automated exposure control for dose reduction was used.

 

DATE OF EXAM: 12/12/2024 9:20 PM

 

COMPARISON: CT abdomen pelvis 5/3/2020. 

 

CLINICAL INDICATION:Male, 39 years old with history of flank pain, hematuria; Flank pain, hematuria.

 

TECHNIQUE:  Axial CT of the abdomen and pelvis. Sagittal and coronal reformats were created on a [employer] workstation. 

 

Contrast used: , (none if empty)

Oral contrast used: without Oral Contrast (none if empty)

 

FINDINGS: 

LOWER CHEST: Unremarkable

 

ABDOMEN

LIVER: Unremarkable

GALLBLADDER AND BILE DUCTS: Unremarkable.

PANCREAS: Unremarkable.

SPLEEN: Unremarkable.

ADRENAL GLANDS: Unremarkable.

KIDNEYS AND URETERS: Bilateral renal calculi are identified. There is a 3 mm right midureteral calcul
us identified without significant hydronephrosis.  

 

PELVIS

BLADDER: Incompletely distended but grossly unremarkable.

REPRODUCTIVE: Unremarkable.

 

ABDOMEN & PELVIS

STOMACH AND BOWEL: Stomach and duodenum are unremarkable. No evidence of bowel obstruction. 

PERITONEUM/RETROPERITONEUM: No evidence of pneumoperitoneum or free fluid.

VASCULATURE: No evidence of aortic aneurysm. 

MUSCULOSKELETAL: No acute osseous abnormalities

LYMPH NODES: No gross evidence for lymphadenopathy.

SOFT TISSUE/ABDOMINAL WALL: Unremarkable

 

IMPRESSION:

1. Right mid ureteral 3 mm calculus without significant hydronephrosis.

2. Bilateral renal calculi.  

 

[facility] Kenzie Douglas, Workstation: [id number], 12/12/2024 9:44 PM